# Patient Record
Sex: MALE | URBAN - METROPOLITAN AREA
[De-identification: names, ages, dates, MRNs, and addresses within clinical notes are randomized per-mention and may not be internally consistent; named-entity substitution may affect disease eponyms.]

---

## 2017-03-15 ENCOUNTER — DOCTOR'S OFFICE (OUTPATIENT)
Dept: URBAN - METROPOLITAN AREA CLINIC 2 | Facility: CLINIC | Age: 57
Setting detail: OPHTHALMOLOGY
End: 2017-03-15
Payer: COMMERCIAL

## 2017-03-15 DIAGNOSIS — H43.393: ICD-10-CM

## 2017-03-15 DIAGNOSIS — H40.003: ICD-10-CM

## 2017-03-15 DIAGNOSIS — E13.311: ICD-10-CM

## 2017-03-15 DIAGNOSIS — H04.553: ICD-10-CM

## 2017-03-15 DIAGNOSIS — E13.319: ICD-10-CM

## 2017-03-15 DIAGNOSIS — E11.319: ICD-10-CM

## 2017-03-15 DIAGNOSIS — H16.223: ICD-10-CM

## 2017-03-15 DIAGNOSIS — H35.373: ICD-10-CM

## 2017-03-15 PROCEDURE — 92083 EXTENDED VISUAL FIELD XM: CPT | Performed by: OPHTHALMOLOGY

## 2017-03-15 PROCEDURE — 68801 DILATE TEAR DUCT OPENING: CPT | Performed by: OPHTHALMOLOGY

## 2017-03-15 PROCEDURE — 92014 COMPRE OPH EXAM EST PT 1/>: CPT | Performed by: OPHTHALMOLOGY

## 2017-03-15 PROCEDURE — 92226 OPHTH EXT. SUB.: CPT | Performed by: OPHTHALMOLOGY

## 2017-03-15 PROCEDURE — 92134 CPTRZ OPH DX IMG PST SGM RTA: CPT | Performed by: OPHTHALMOLOGY

## 2017-03-15 ASSESSMENT — REFRACTION_CURRENTRX
OD_AXIS: 157
OD_CYLINDER: -0.25
OD_OVR_VA: 20/
OS_OVR_VA: 20/
OD_OVR_VA: 20/
OS_CYLINDER: -0.25
OS_AXIS: 107
OD_ADD: +1.25
OS_ADD: +1.25
OD_OVR_VA: 20/
OS_OVR_VA: 20/
OS_SPHERE: -0.75
OS_OVR_VA: 20/
OD_SPHERE: -0.50

## 2017-03-15 ASSESSMENT — REFRACTION_MANIFEST
OD_VA1: 20/
OS_VA2: 20/
OS_VA1: 20/
OS_VA1: 20/
OU_VA: 20/
OD_VA1: 20/
OS_VA2: 20/
OD_VA3: 20/
OD_VA2: 20/
OD_VA2: 20/
OS_VA3: 20/
OD_VA3: 20/
OU_VA: 20/
OS_VA3: 20/

## 2017-03-15 ASSESSMENT — CONFRONTATIONAL VISUAL FIELD TEST (CVF)
OS_FINDINGS: FULL
OD_FINDINGS: FULL

## 2017-03-15 ASSESSMENT — VISUAL ACUITY
OS_BCVA: 20/
OD_BCVA: 20/

## 2017-03-15 ASSESSMENT — REFRACTION_OUTSIDERX
OS_VA2: 20/
OD_VA3: 20/
OS_VA1: 20/
OS_SPHERE: -0.75
OS_ADD: +1.25
OS_AXIS: 107
OU_VA: 20/
OD_ADD: +1.25
OD_SPHERE: -0.50
OS_VA3: 20/
OD_VA1: 20/
OD_CYLINDER: -0.25
OD_VA2: 20/
OS_CYLINDER: -0.25
OD_AXIS: 157

## 2017-03-15 ASSESSMENT — REFRACTION_AUTOREFRACTION
OD_CYLINDER: -0.50
OD_AXIS: 112
OS_AXIS: 94
OD_SPHERE: +0.75
OS_CYLINDER: -1.00
OS_SPHERE: +0.50

## 2017-03-15 ASSESSMENT — SPHEQUIV_DERIVED
OS_SPHEQUIV: 0
OD_SPHEQUIV: 0.5

## 2017-03-15 ASSESSMENT — SUPERFICIAL PUNCTATE KERATITIS (SPK)
OD_SPK: 1+
OS_SPK: 1+

## 2018-01-23 NOTE — PATIENT DISCUSSION
The patient was told that their pupil does not dilate well and this will make for an operation that might involve extra steps and, perhaps, extra risks. This could also mean that their pupil might be larger after procedure.

## 2018-03-26 ENCOUNTER — DOCTOR'S OFFICE (OUTPATIENT)
Dept: URBAN - METROPOLITAN AREA CLINIC 2 | Facility: CLINIC | Age: 58
Setting detail: OPHTHALMOLOGY
End: 2018-03-26
Payer: COMMERCIAL

## 2018-03-26 DIAGNOSIS — H16.223: ICD-10-CM

## 2018-03-26 DIAGNOSIS — E13.311: ICD-10-CM

## 2018-03-26 DIAGNOSIS — H47.323: ICD-10-CM

## 2018-03-26 DIAGNOSIS — H43.393: ICD-10-CM

## 2018-03-26 DIAGNOSIS — H35.373: ICD-10-CM

## 2018-03-26 DIAGNOSIS — E11.319: ICD-10-CM

## 2018-03-26 DIAGNOSIS — H40.003: ICD-10-CM

## 2018-03-26 DIAGNOSIS — E13.319: ICD-10-CM

## 2018-03-26 PROCEDURE — 92250 FUNDUS PHOTOGRAPHY W/I&R: CPT | Performed by: OPHTHALMOLOGY

## 2018-03-26 PROCEDURE — 76512 OPH US DX B-SCAN: CPT | Performed by: OPHTHALMOLOGY

## 2018-03-26 PROCEDURE — 92014 COMPRE OPH EXAM EST PT 1/>: CPT | Performed by: OPHTHALMOLOGY

## 2018-03-26 PROCEDURE — 92083 EXTENDED VISUAL FIELD XM: CPT | Performed by: OPHTHALMOLOGY

## 2018-03-26 PROCEDURE — 99058 OFFICE EMERGENCY CARE: CPT | Performed by: OPHTHALMOLOGY

## 2018-03-26 PROCEDURE — 92134 CPTRZ OPH DX IMG PST SGM RTA: CPT | Performed by: OPHTHALMOLOGY

## 2018-03-26 ASSESSMENT — REFRACTION_MANIFEST
OD_VA1: 20/
OD_VA1: 20/
OD_VA2: 20/
OS_VA3: 20/
OD_VA3: 20/
OS_VA2: 20/
OD_VA3: 20/
OS_VA2: 20/
OU_VA: 20/
OS_VA1: 20/
OS_VA1: 20/
OU_VA: 20/
OD_VA2: 20/
OS_VA3: 20/

## 2018-03-26 ASSESSMENT — REFRACTION_OUTSIDERX
OS_VA1: 20/
OS_AXIS: 107
OD_VA3: 20/
OS_VA3: 20/
OS_SPHERE: -0.75
OD_VA2: 20/
OD_SPHERE: -0.50
OD_ADD: +1.25
OD_VA1: 20/
OU_VA: 20/
OD_CYLINDER: -0.25
OS_ADD: +1.25
OD_AXIS: 157
OS_VA2: 20/
OS_CYLINDER: -0.25

## 2018-03-26 ASSESSMENT — REFRACTION_CURRENTRX
OS_ADD: +1.25
OS_AXIS: 107
OS_CYLINDER: -0.25
OD_OVR_VA: 20/
OS_OVR_VA: 20/
OS_SPHERE: -0.75
OD_AXIS: 157
OD_OVR_VA: 20/
OD_CYLINDER: -0.25
OD_SPHERE: -0.50
OD_OVR_VA: 20/
OD_ADD: +1.25
OS_OVR_VA: 20/
OS_OVR_VA: 20/

## 2018-03-26 ASSESSMENT — REFRACTION_AUTOREFRACTION
OD_CYLINDER: -0.50
OS_CYLINDER: -1.00
OD_AXIS: 112
OS_AXIS: 94
OS_SPHERE: +0.50
OD_SPHERE: +0.75

## 2018-03-26 ASSESSMENT — VISUAL ACUITY
OS_BCVA: 20/
OD_BCVA: 20/

## 2018-03-26 ASSESSMENT — SPHEQUIV_DERIVED
OS_SPHEQUIV: 0
OD_SPHEQUIV: 0.5

## 2018-03-26 ASSESSMENT — SUPERFICIAL PUNCTATE KERATITIS (SPK)
OS_SPK: 1+
OD_SPK: 1+

## 2018-03-26 ASSESSMENT — CONFRONTATIONAL VISUAL FIELD TEST (CVF)
OD_FINDINGS: FULL
OS_FINDINGS: FULL

## 2018-10-24 NOTE — PROCEDURE NOTE: CLINICAL
PROCEDURE NOTE: Punctal Plugs, Silicone #2 Bilateral Lower Lids. Diagnosis: Dry Eye Syndrome. Anesthesia: Topical. Prior to treatment, the risks/benefits/alternatives were discussed. The patient wished to proceed with procedure. Permanent silicone plugs were inserted. Patient tolerated procedure well. There were no complications. Post procedure instructions given. Size . 6. Lukas Greening

## 2018-11-14 NOTE — PROCEDURE NOTE: CLINICAL
PROCEDURE NOTE: Punctal Plugs, Silicone #2 Bilateral Lower Lids. Diagnosis: Dry Eye Syndrome. Anesthesia: Topical. Prep: Antibiotic Drops q 5min x 3. Prior to treatment, the risks/benefits/alternatives were discussed. The patient wished to proceed with procedure. Permanent silicone plugs were inserted. Patient tolerated procedure well. There were no complications. Post procedure instructions given. Size . 7. Cheryl Rodriguez

## 2018-12-31 NOTE — PROCEDURE NOTE: CLINICAL
PROCEDURE NOTE: Punctal Plugs, Silicone #1 Anesthesia: Topical. Prep: Antibiotic Drops q 5min x 3. Prior to treatment, the risks/benefits/alternatives were discussed. The patient wished to proceed with procedure. Permanent silicone plugs were inserted. Patient tolerated procedure well. There were no complications. Post procedure instructions given. Size . 8. Cody Cool

## 2019-01-30 NOTE — PATIENT DISCUSSION
trial contact lens for +/- LASIK OS for dawood. Patient education on near vision worsening with the contact lens/ LASIK.

## 2019-04-27 PROBLEM — H43.393 VITREOUS FLOATERS: Status: ACTIVE | Noted: 2017-03-15

## 2019-04-27 PROBLEM — H35.373 MACULAR PUCKER: Status: ACTIVE | Noted: 2017-03-15

## 2019-04-27 PROBLEM — H40.003 PREGLAUCOMA; BOTH EYES: Status: ACTIVE | Noted: 2017-03-15

## 2019-04-27 PROBLEM — H47.323 DRUSEN OF OPTIC DISC; BOTH EYES: Status: ACTIVE | Noted: 2018-03-26

## 2019-04-27 PROBLEM — H16.223 KERATOCONJUNCTIVITIS SICCA: Status: ACTIVE | Noted: 2017-03-15

## 2019-04-27 PROBLEM — E13.311 BACKGROUND DIABETIC RETINOPATHY: Status: ACTIVE | Noted: 2018-03-26

## 2019-04-27 PROBLEM — E13.319 BACKGROUND DIABETIC RETINOPATHY: Status: ACTIVE | Noted: 2018-03-26

## 2019-04-27 PROBLEM — E11.319 BACKGROUND DIABETIC RETINOPATHY: Status: ACTIVE | Noted: 2018-03-26

## 2019-07-26 ENCOUNTER — OFFICE (OUTPATIENT)
Dept: URBAN - METROPOLITAN AREA CLINIC 109 | Facility: CLINIC | Age: 59
Setting detail: OPHTHALMOLOGY
End: 2019-07-26
Payer: COMMERCIAL

## 2019-07-26 DIAGNOSIS — H02.834: ICD-10-CM

## 2019-07-26 DIAGNOSIS — H16.223: ICD-10-CM

## 2019-07-26 DIAGNOSIS — H43.393: ICD-10-CM

## 2019-07-26 DIAGNOSIS — H10.45: ICD-10-CM

## 2019-07-26 DIAGNOSIS — E11.319: ICD-10-CM

## 2019-07-26 DIAGNOSIS — H02.832: ICD-10-CM

## 2019-07-26 DIAGNOSIS — H02.831: ICD-10-CM

## 2019-07-26 DIAGNOSIS — H35.373: ICD-10-CM

## 2019-07-26 DIAGNOSIS — H47.323: ICD-10-CM

## 2019-07-26 DIAGNOSIS — H02.835: ICD-10-CM

## 2019-07-26 DIAGNOSIS — H40.013: ICD-10-CM

## 2019-07-26 DIAGNOSIS — E13.319: ICD-10-CM

## 2019-07-26 PROCEDURE — 76514 ECHO EXAM OF EYE THICKNESS: CPT | Performed by: OPHTHALMOLOGY

## 2019-07-26 PROCEDURE — 92083 EXTENDED VISUAL FIELD XM: CPT | Performed by: OPHTHALMOLOGY

## 2019-07-26 PROCEDURE — 92133 CPTRZD OPH DX IMG PST SGM ON: CPT | Performed by: OPHTHALMOLOGY

## 2019-07-26 PROCEDURE — 92020 GONIOSCOPY: CPT | Performed by: OPHTHALMOLOGY

## 2019-07-26 PROCEDURE — 92226 OPHTHALMOSCOPY, SUBSEQUENT: CPT | Performed by: OPHTHALMOLOGY

## 2019-07-26 PROCEDURE — 92014 COMPRE OPH EXAM EST PT 1/>: CPT | Performed by: OPHTHALMOLOGY

## 2019-07-26 ASSESSMENT — REFRACTION_MANIFEST
OS_AXIS: 107
OD_VA1: 20/
OD_AXIS: 157
OU_VA: 20/
OS_VA2: 20/
OU_VA: 20/
OD_VA2: 20/
OS_ADD: +1.25
OD_SPHERE: -0.50
OS_VA2: 20/
OD_VA3: 20/
OD_VA2: 20/
OS_VA3: 20/
OD_VA3: 20/
OS_SPHERE: -0.75
OS_VA1: 20/
OD_CYLINDER: -0.25
OS_VA3: 20/
OD_VA1: 20/
OS_VA1: 20/
OD_ADD: +1.25
OS_CYLINDER: -0.25

## 2019-07-26 ASSESSMENT — REFRACTION_CURRENTRX
OD_SPHERE: -0.50
OD_OVR_VA: 20/
OD_OVR_VA: 20/
OD_AXIS: 157
OS_OVR_VA: 20/
OD_ADD: +1.25
OS_OVR_VA: 20/
OS_OVR_VA: 20/
OS_AXIS: 107
OD_CYLINDER: -0.25
OS_ADD: +1.25
OS_CYLINDER: -0.25
OD_OVR_VA: 20/
OS_SPHERE: -0.75

## 2019-07-26 ASSESSMENT — CONFRONTATIONAL VISUAL FIELD TEST (CVF)
OS_FINDINGS: FULL
OD_FINDINGS: FULL

## 2019-07-26 ASSESSMENT — LID POSITION - DERMATOCHALASIS
OD_DERMATOCHALASIS: 1+
OS_DERMATOCHALASIS: 1+

## 2019-07-26 ASSESSMENT — SPHEQUIV_DERIVED
OS_SPHEQUIV: -0.875
OS_SPHEQUIV: -0.125
OD_SPHEQUIV: -0.625
OD_SPHEQUIV: 0.75

## 2019-07-26 ASSESSMENT — SUPERFICIAL PUNCTATE KERATITIS (SPK)
OD_SPK: 1+
OS_SPK: 1+

## 2019-07-26 ASSESSMENT — VISUAL ACUITY
OS_BCVA: 20/20-2
OD_BCVA: 20/40

## 2019-07-26 ASSESSMENT — REFRACTION_AUTOREFRACTION
OS_AXIS: 93
OD_AXIS: 107
OD_SPHERE: +1.00
OS_CYLINDER: -0.75
OD_CYLINDER: -0.50
OS_SPHERE: +0.25

## 2019-07-26 ASSESSMENT — PACHYMETRY
OS_CT_CORRECTION: -1
OS_CT_UM: 555
OD_CT_UM: 553
OD_CT_CORRECTION: -1

## 2019-07-31 ENCOUNTER — IMPORTED ENCOUNTER (OUTPATIENT)
Age: 59
End: 2019-07-31

## 2019-10-28 ENCOUNTER — OFFICE (OUTPATIENT)
Dept: URBAN - METROPOLITAN AREA CLINIC 109 | Facility: CLINIC | Age: 59
Setting detail: OPHTHALMOLOGY
End: 2019-10-28
Payer: COMMERCIAL

## 2019-10-28 DIAGNOSIS — E11.319: ICD-10-CM

## 2019-10-28 DIAGNOSIS — E13.319: ICD-10-CM

## 2019-10-28 DIAGNOSIS — H35.373: ICD-10-CM

## 2019-10-28 PROCEDURE — 92014 COMPRE OPH EXAM EST PT 1/>: CPT | Performed by: OPHTHALMOLOGY

## 2019-10-28 PROCEDURE — 92250 FUNDUS PHOTOGRAPHY W/I&R: CPT | Performed by: OPHTHALMOLOGY

## 2019-10-28 ASSESSMENT — REFRACTION_AUTOREFRACTION
OS_AXIS: 95
OS_CYLINDER: -1.00
OD_AXIS: 104
OD_SPHERE: +0.50
OD_CYLINDER: -0.50
OS_SPHERE: +1.00

## 2019-10-28 ASSESSMENT — SUPERFICIAL PUNCTATE KERATITIS (SPK)
OS_SPK: 1+
OD_SPK: 1+

## 2019-10-28 ASSESSMENT — VISUAL ACUITY
OS_BCVA: 20/100
OD_BCVA: 20/50

## 2019-10-28 ASSESSMENT — REFRACTION_CURRENTRX
OD_CYLINDER: -0.25
OD_ADD: +1.25
OS_AXIS: 107
OD_AXIS: 157
OD_SPHERE: -0.50
OS_OVR_VA: 20/
OD_OVR_VA: 20/
OS_SPHERE: -0.75
OS_ADD: +1.25
OS_CYLINDER: -0.25

## 2019-10-28 ASSESSMENT — REFRACTION_MANIFEST
OS_ADD: +1.25
OS_SPHERE: -0.75
OS_AXIS: 107
OD_VA1: 20/
OD_SPHERE: -0.50
OD_VA2: 20/
OS_VA3: 20/
OD_CYLINDER: -0.25
OD_VA3: 20/
OD_AXIS: 157
OS_CYLINDER: -0.25
OU_VA: 20/
OS_VA2: 20/
OS_VA1: 20/
OD_ADD: +1.25

## 2019-10-28 ASSESSMENT — SPHEQUIV_DERIVED
OD_SPHEQUIV: -0.625
OS_SPHEQUIV: 0.5
OS_SPHEQUIV: -0.875
OD_SPHEQUIV: 0.25

## 2019-10-28 ASSESSMENT — LID POSITION - DERMATOCHALASIS
OD_DERMATOCHALASIS: 1+
OS_DERMATOCHALASIS: 1+

## 2019-10-28 ASSESSMENT — CONFRONTATIONAL VISUAL FIELD TEST (CVF)
OD_FINDINGS: FULL
OS_FINDINGS: FULL

## 2019-10-31 ENCOUNTER — OFFICE (OUTPATIENT)
Dept: URBAN - METROPOLITAN AREA CLINIC 32 | Facility: CLINIC | Age: 59
Setting detail: OPHTHALMOLOGY
End: 2019-10-31
Payer: COMMERCIAL

## 2019-10-31 DIAGNOSIS — H43.12: ICD-10-CM

## 2019-10-31 DIAGNOSIS — E11.3592: ICD-10-CM

## 2019-10-31 DIAGNOSIS — E11.3591: ICD-10-CM

## 2019-10-31 PROCEDURE — 92134 CPTRZ OPH DX IMG PST SGM RTA: CPT | Performed by: OPHTHALMOLOGY

## 2019-10-31 PROCEDURE — 92226 OPHTHALMOSCOPY, SUBSEQUENT: CPT | Performed by: OPHTHALMOLOGY

## 2019-10-31 PROCEDURE — 99215 OFFICE O/P EST HI 40 MIN: CPT | Performed by: OPHTHALMOLOGY

## 2019-10-31 PROCEDURE — 67028 INJECTION EYE DRUG: CPT | Performed by: OPHTHALMOLOGY

## 2019-10-31 PROCEDURE — 92226 OPHTHALMOSCOPY (DILATION), SUBSEQUENT: CPT | Performed by: OPHTHALMOLOGY

## 2019-10-31 PROCEDURE — 92235 FLUORESCEIN ANGRPH MLTIFRAME: CPT | Performed by: OPHTHALMOLOGY

## 2019-10-31 ASSESSMENT — REFRACTION_MANIFEST
OD_ADD: +1.25
OD_SPHERE: -0.50
OD_AXIS: 157
OS_VA3: 20/
OS_VA2: 20/
OD_VA1: 20/
OS_CYLINDER: -0.25
OS_ADD: +1.25
OS_AXIS: 107
OD_VA3: 20/
OS_SPHERE: -0.75
OD_VA2: 20/
OU_VA: 20/
OS_VA1: 20/
OD_CYLINDER: -0.25

## 2019-10-31 ASSESSMENT — CONFRONTATIONAL VISUAL FIELD TEST (CVF)
OS_FINDINGS: FULL
OD_FINDINGS: FULL

## 2019-10-31 ASSESSMENT — VISUAL ACUITY
OS_BCVA: 20/20
OD_BCVA: 20/20

## 2019-10-31 ASSESSMENT — REFRACTION_CURRENTRX
OD_ADD: +1.25
OS_SPHERE: -0.75
OD_OVR_VA: 20/
OD_AXIS: 157
OD_SPHERE: -0.50
OS_OVR_VA: 20/
OS_CYLINDER: -0.25
OS_AXIS: 107
OD_CYLINDER: -0.25
OS_ADD: +1.25

## 2019-10-31 ASSESSMENT — SUPERFICIAL PUNCTATE KERATITIS (SPK)
OS_SPK: 1+
OD_SPK: 1+

## 2019-10-31 ASSESSMENT — REFRACTION_AUTOREFRACTION
OD_SPHERE: +0.50
OS_CYLINDER: -1.00
OS_AXIS: 95
OD_AXIS: 104
OS_SPHERE: +1.00
OD_CYLINDER: -0.50

## 2019-10-31 ASSESSMENT — SPHEQUIV_DERIVED
OD_SPHEQUIV: 0.25
OS_SPHEQUIV: -0.875
OS_SPHEQUIV: 0.5
OD_SPHEQUIV: -0.625

## 2019-10-31 ASSESSMENT — LID POSITION - DERMATOCHALASIS
OD_DERMATOCHALASIS: 1+
OS_DERMATOCHALASIS: 1+

## 2019-11-07 ENCOUNTER — OFFICE (OUTPATIENT)
Dept: URBAN - METROPOLITAN AREA CLINIC 32 | Facility: CLINIC | Age: 59
Setting detail: OPHTHALMOLOGY
End: 2019-11-07
Payer: COMMERCIAL

## 2019-11-07 DIAGNOSIS — E11.3592: ICD-10-CM

## 2019-11-07 PROCEDURE — 67228 TREATMENT X10SV RETINOPATHY: CPT | Performed by: OPHTHALMOLOGY

## 2019-11-07 ASSESSMENT — REFRACTION_MANIFEST
OS_VA2: 20/
OS_VA3: 20/
OD_VA3: 20/
OS_AXIS: 107
OS_CYLINDER: -0.25
OD_VA1: 20/
OS_ADD: +1.25
OD_CYLINDER: -0.25
OD_ADD: +1.25
OD_SPHERE: -0.50
OU_VA: 20/
OS_VA1: 20/
OD_VA2: 20/
OS_SPHERE: -0.75
OD_AXIS: 157

## 2019-11-07 ASSESSMENT — CONFRONTATIONAL VISUAL FIELD TEST (CVF)
OS_FINDINGS: FULL
OD_FINDINGS: FULL

## 2019-11-07 ASSESSMENT — REFRACTION_AUTOREFRACTION
OS_AXIS: 95
OS_SPHERE: +1.00
OD_SPHERE: +0.50
OS_CYLINDER: -1.00
OD_AXIS: 104
OD_CYLINDER: -0.50

## 2019-11-07 ASSESSMENT — REFRACTION_CURRENTRX
OD_CYLINDER: -0.25
OS_ADD: +1.25
OS_CYLINDER: -0.25
OS_AXIS: 107
OD_SPHERE: -0.50
OD_ADD: +1.25
OS_SPHERE: -0.75
OD_AXIS: 157
OS_OVR_VA: 20/
OD_OVR_VA: 20/

## 2019-11-07 ASSESSMENT — VISUAL ACUITY
OD_BCVA: 20/40+
OS_BCVA: 20/20

## 2019-11-07 ASSESSMENT — SPHEQUIV_DERIVED
OD_SPHEQUIV: -0.625
OS_SPHEQUIV: -0.875
OS_SPHEQUIV: 0.5
OD_SPHEQUIV: 0.25

## 2019-11-21 ENCOUNTER — OFFICE (OUTPATIENT)
Dept: URBAN - METROPOLITAN AREA CLINIC 32 | Facility: CLINIC | Age: 59
Setting detail: OPHTHALMOLOGY
End: 2019-11-21
Payer: COMMERCIAL

## 2019-11-21 DIAGNOSIS — E11.3591: ICD-10-CM

## 2019-11-21 PROCEDURE — 67228 TREATMENT X10SV RETINOPATHY: CPT | Performed by: OPHTHALMOLOGY

## 2019-11-21 PROCEDURE — 92134 CPTRZ OPH DX IMG PST SGM RTA: CPT | Performed by: OPHTHALMOLOGY

## 2019-11-21 ASSESSMENT — REFRACTION_MANIFEST
OU_VA: 20/
OD_AXIS: 157
OS_VA2: 20/
OD_VA1: 20/
OD_ADD: +1.25
OS_VA1: 20/
OD_VA2: 20/
OS_ADD: +1.25
OD_SPHERE: -0.50
OS_SPHERE: -0.75
OS_AXIS: 107
OD_VA3: 20/
OD_CYLINDER: -0.25
OS_VA3: 20/
OS_CYLINDER: -0.25

## 2019-11-21 ASSESSMENT — SPHEQUIV_DERIVED
OD_SPHEQUIV: 0.25
OS_SPHEQUIV: -0.875
OD_SPHEQUIV: -0.625
OS_SPHEQUIV: 0.5

## 2019-11-21 ASSESSMENT — REFRACTION_AUTOREFRACTION
OD_CYLINDER: -0.50
OS_SPHERE: +1.00
OS_AXIS: 95
OD_AXIS: 104
OS_CYLINDER: -1.00
OD_SPHERE: +0.50

## 2019-11-21 ASSESSMENT — VISUAL ACUITY
OD_BCVA: 20/20-1
OS_BCVA: 20/20

## 2019-11-21 ASSESSMENT — REFRACTION_CURRENTRX
OD_SPHERE: -0.50
OD_OVR_VA: 20/
OS_OVR_VA: 20/
OD_ADD: +1.25
OD_CYLINDER: -0.25
OD_AXIS: 157
OS_ADD: +1.25
OS_SPHERE: -0.75
OS_CYLINDER: -0.25
OS_AXIS: 107

## 2019-11-21 ASSESSMENT — LID POSITION - DERMATOCHALASIS
OD_DERMATOCHALASIS: 1+
OS_DERMATOCHALASIS: 1+

## 2019-11-21 ASSESSMENT — SUPERFICIAL PUNCTATE KERATITIS (SPK)
OD_SPK: 1+
OS_SPK: 1+

## 2019-12-05 ENCOUNTER — OFFICE (OUTPATIENT)
Dept: URBAN - METROPOLITAN AREA CLINIC 32 | Facility: CLINIC | Age: 59
Setting detail: OPHTHALMOLOGY
End: 2019-12-05
Payer: COMMERCIAL

## 2019-12-05 DIAGNOSIS — E11.3592: ICD-10-CM

## 2019-12-05 PROCEDURE — 67228 TREATMENT X10SV RETINOPATHY: CPT | Performed by: OPHTHALMOLOGY

## 2019-12-05 ASSESSMENT — REFRACTION_MANIFEST
OD_VA2: 20/
OU_VA: 20/
OS_VA1: 20/
OD_ADD: +1.25
OS_VA2: 20/
OS_AXIS: 107
OD_VA1: 20/
OS_ADD: +1.25
OD_VA3: 20/
OD_AXIS: 157
OS_CYLINDER: -0.25
OS_VA3: 20/
OS_SPHERE: -0.75
OD_SPHERE: -0.50
OD_CYLINDER: -0.25

## 2019-12-05 ASSESSMENT — REFRACTION_AUTOREFRACTION
OD_AXIS: 104
OD_CYLINDER: -0.50
OS_CYLINDER: -1.00
OS_AXIS: 95
OS_SPHERE: +1.00
OD_SPHERE: +0.50

## 2019-12-05 ASSESSMENT — SPHEQUIV_DERIVED
OD_SPHEQUIV: -0.625
OS_SPHEQUIV: 0.5
OS_SPHEQUIV: -0.875
OD_SPHEQUIV: 0.25

## 2019-12-05 ASSESSMENT — REFRACTION_CURRENTRX
OS_CYLINDER: -0.25
OS_AXIS: 107
OD_OVR_VA: 20/
OS_OVR_VA: 20/
OS_SPHERE: -0.75
OD_AXIS: 157
OS_ADD: +1.25
OD_CYLINDER: -0.25
OD_SPHERE: -0.50
OD_ADD: +1.25

## 2019-12-05 ASSESSMENT — SUPERFICIAL PUNCTATE KERATITIS (SPK)
OD_SPK: 1+
OS_SPK: 1+

## 2019-12-05 ASSESSMENT — CONFRONTATIONAL VISUAL FIELD TEST (CVF)
OD_FINDINGS: FULL
OS_FINDINGS: FULL

## 2019-12-05 ASSESSMENT — LID POSITION - DERMATOCHALASIS
OS_DERMATOCHALASIS: 1+
OD_DERMATOCHALASIS: 1+

## 2019-12-05 ASSESSMENT — VISUAL ACUITY
OD_BCVA: 20/20
OS_BCVA: 20/20

## 2019-12-12 ENCOUNTER — OFFICE (OUTPATIENT)
Dept: URBAN - METROPOLITAN AREA CLINIC 32 | Facility: CLINIC | Age: 59
Setting detail: OPHTHALMOLOGY
End: 2019-12-12
Payer: COMMERCIAL

## 2019-12-12 DIAGNOSIS — E11.3592: ICD-10-CM

## 2019-12-12 DIAGNOSIS — E11.3591: ICD-10-CM

## 2019-12-12 PROCEDURE — 67028 INJECTION EYE DRUG: CPT | Performed by: OPHTHALMOLOGY

## 2019-12-12 PROCEDURE — 92134 CPTRZ OPH DX IMG PST SGM RTA: CPT | Performed by: OPHTHALMOLOGY

## 2019-12-12 ASSESSMENT — REFRACTION_CURRENTRX
OD_CYLINDER: -0.25
OS_ADD: +1.25
OD_OVR_VA: 20/
OS_AXIS: 107
OD_AXIS: 157
OD_SPHERE: -0.50
OS_SPHERE: -0.75
OD_ADD: +1.25
OS_OVR_VA: 20/
OS_CYLINDER: -0.25

## 2019-12-12 ASSESSMENT — REFRACTION_MANIFEST
OD_AXIS: 157
OS_VA2: 20/
OS_VA1: 20/
OD_VA2: 20/
OD_VA3: 20/
OU_VA: 20/
OS_ADD: +1.25
OS_AXIS: 107
OS_SPHERE: -0.75
OD_VA1: 20/
OS_CYLINDER: -0.25
OD_SPHERE: -0.50
OD_ADD: +1.25
OS_VA3: 20/
OD_CYLINDER: -0.25

## 2019-12-12 ASSESSMENT — SPHEQUIV_DERIVED
OD_SPHEQUIV: -0.625
OS_SPHEQUIV: -0.875
OS_SPHEQUIV: 0.5
OD_SPHEQUIV: 0.25

## 2019-12-12 ASSESSMENT — REFRACTION_AUTOREFRACTION
OS_AXIS: 95
OS_SPHERE: +1.00
OS_CYLINDER: -1.00
OD_AXIS: 104
OD_CYLINDER: -0.50
OD_SPHERE: +0.50

## 2019-12-12 ASSESSMENT — SUPERFICIAL PUNCTATE KERATITIS (SPK)
OS_SPK: 1+
OD_SPK: 1+

## 2019-12-12 ASSESSMENT — CONFRONTATIONAL VISUAL FIELD TEST (CVF)
OD_FINDINGS: FULL
OS_FINDINGS: FULL

## 2019-12-12 ASSESSMENT — LID POSITION - DERMATOCHALASIS
OS_DERMATOCHALASIS: 1+
OD_DERMATOCHALASIS: 1+

## 2019-12-12 ASSESSMENT — VISUAL ACUITY
OD_BCVA: 20/20
OS_BCVA: 20/20

## 2019-12-19 ENCOUNTER — OFFICE (OUTPATIENT)
Dept: URBAN - METROPOLITAN AREA CLINIC 32 | Facility: CLINIC | Age: 59
Setting detail: OPHTHALMOLOGY
End: 2019-12-19
Payer: COMMERCIAL

## 2019-12-19 DIAGNOSIS — E11.3591: ICD-10-CM

## 2019-12-19 PROCEDURE — 67228 TREATMENT X10SV RETINOPATHY: CPT | Performed by: OPHTHALMOLOGY

## 2019-12-19 ASSESSMENT — SUPERFICIAL PUNCTATE KERATITIS (SPK)
OS_SPK: 1+
OD_SPK: 1+

## 2019-12-19 ASSESSMENT — REFRACTION_CURRENTRX
OD_CYLINDER: -0.25
OS_CYLINDER: -0.25
OD_SPHERE: -0.50
OD_ADD: +1.25
OD_OVR_VA: 20/
OS_ADD: +1.25
OS_OVR_VA: 20/
OD_AXIS: 157
OS_SPHERE: -0.75
OS_AXIS: 107

## 2019-12-19 ASSESSMENT — REFRACTION_MANIFEST
OD_ADD: +1.25
OS_VA3: 20/
OS_SPHERE: -0.75
OD_AXIS: 157
OD_VA3: 20/
OD_VA1: 20/
OS_AXIS: 107
OD_SPHERE: -0.50
OD_VA2: 20/
OS_CYLINDER: -0.25
OD_CYLINDER: -0.25
OS_VA1: 20/
OS_ADD: +1.25
OU_VA: 20/
OS_VA2: 20/

## 2019-12-19 ASSESSMENT — SPHEQUIV_DERIVED
OS_SPHEQUIV: 0.5
OS_SPHEQUIV: -0.875
OD_SPHEQUIV: -0.625
OD_SPHEQUIV: 0.25

## 2019-12-19 ASSESSMENT — LID POSITION - DERMATOCHALASIS
OD_DERMATOCHALASIS: 1+
OS_DERMATOCHALASIS: 1+

## 2019-12-19 ASSESSMENT — REFRACTION_AUTOREFRACTION
OD_AXIS: 104
OD_CYLINDER: -0.50
OD_SPHERE: +0.50
OS_CYLINDER: -1.00
OS_AXIS: 95
OS_SPHERE: +1.00

## 2019-12-19 ASSESSMENT — VISUAL ACUITY
OS_BCVA: 20/20
OD_BCVA: 20/20

## 2019-12-19 ASSESSMENT — CONFRONTATIONAL VISUAL FIELD TEST (CVF)
OD_FINDINGS: FULL
OS_FINDINGS: FULL

## 2020-01-23 ENCOUNTER — OFFICE (OUTPATIENT)
Dept: URBAN - METROPOLITAN AREA CLINIC 32 | Facility: CLINIC | Age: 60
Setting detail: OPHTHALMOLOGY
End: 2020-01-23
Payer: COMMERCIAL

## 2020-01-23 DIAGNOSIS — H43.12: ICD-10-CM

## 2020-01-23 DIAGNOSIS — E11.3591: ICD-10-CM

## 2020-01-23 DIAGNOSIS — E11.3592: ICD-10-CM

## 2020-01-23 PROCEDURE — 92235 FLUORESCEIN ANGRPH MLTIFRAME: CPT | Performed by: OPHTHALMOLOGY

## 2020-01-23 PROCEDURE — 92014 COMPRE OPH EXAM EST PT 1/>: CPT | Performed by: OPHTHALMOLOGY

## 2020-01-23 PROCEDURE — 92202 OPSCPY EXTND ON/MAC DRAW: CPT | Performed by: OPHTHALMOLOGY

## 2020-01-23 PROCEDURE — 92134 CPTRZ OPH DX IMG PST SGM RTA: CPT | Performed by: OPHTHALMOLOGY

## 2020-01-23 ASSESSMENT — SUPERFICIAL PUNCTATE KERATITIS (SPK)
OS_SPK: 1+
OD_SPK: 1+

## 2020-01-23 ASSESSMENT — REFRACTION_CURRENTRX
OD_CYLINDER: -0.25
OS_ADD: +1.25
OS_AXIS: 107
OS_CYLINDER: -0.25
OS_SPHERE: -0.75
OD_AXIS: 157
OD_ADD: +1.25
OD_SPHERE: -0.50
OD_OVR_VA: 20/
OS_OVR_VA: 20/

## 2020-01-23 ASSESSMENT — REFRACTION_MANIFEST
OS_VA2: 20/
OS_VA1: 20/
OD_SPHERE: -0.50
OS_SPHERE: -0.75
OD_VA3: 20/
OU_VA: 20/
OS_VA3: 20/
OS_AXIS: 107
OD_CYLINDER: -0.25
OS_CYLINDER: -0.25
OS_ADD: +1.25
OD_AXIS: 157
OD_ADD: +1.25
OD_VA2: 20/
OD_VA1: 20/

## 2020-01-23 ASSESSMENT — CONFRONTATIONAL VISUAL FIELD TEST (CVF)
OD_FINDINGS: FULL
OS_FINDINGS: FULL

## 2020-01-23 ASSESSMENT — SPHEQUIV_DERIVED
OD_SPHEQUIV: -0.625
OD_SPHEQUIV: 0.25
OS_SPHEQUIV: -0.875
OS_SPHEQUIV: 0.5

## 2020-01-23 ASSESSMENT — REFRACTION_AUTOREFRACTION
OS_SPHERE: +1.00
OD_CYLINDER: -0.50
OD_AXIS: 104
OD_SPHERE: +0.50
OS_CYLINDER: -1.00
OS_AXIS: 95

## 2020-01-23 ASSESSMENT — LID POSITION - DERMATOCHALASIS
OS_DERMATOCHALASIS: 1+
OD_DERMATOCHALASIS: 1+

## 2020-01-23 ASSESSMENT — VISUAL ACUITY
OD_BCVA: 20/20-1
OS_BCVA: 20/20

## 2020-01-27 ENCOUNTER — OFFICE (OUTPATIENT)
Dept: URBAN - METROPOLITAN AREA CLINIC 109 | Facility: CLINIC | Age: 60
Setting detail: OPHTHALMOLOGY
End: 2020-01-27
Payer: COMMERCIAL

## 2020-01-27 DIAGNOSIS — H40.013: ICD-10-CM

## 2020-01-27 DIAGNOSIS — E11.3591: ICD-10-CM

## 2020-01-27 DIAGNOSIS — E11.3592: ICD-10-CM

## 2020-01-27 DIAGNOSIS — H43.12: ICD-10-CM

## 2020-01-27 PROCEDURE — 92133 CPTRZD OPH DX IMG PST SGM ON: CPT | Performed by: OPHTHALMOLOGY

## 2020-01-27 PROCEDURE — 92014 COMPRE OPH EXAM EST PT 1/>: CPT | Performed by: OPHTHALMOLOGY

## 2020-01-27 PROCEDURE — 92083 EXTENDED VISUAL FIELD XM: CPT | Performed by: OPHTHALMOLOGY

## 2020-01-27 ASSESSMENT — SPHEQUIV_DERIVED
OD_SPHEQUIV: 0.25
OS_SPHEQUIV: 0.5
OD_SPHEQUIV: -0.625
OS_SPHEQUIV: -0.875

## 2020-01-27 ASSESSMENT — REFRACTION_MANIFEST
OD_AXIS: 157
OU_VA: 20/
OD_VA2: 20/
OD_SPHERE: -0.50
OD_VA3: 20/
OS_SPHERE: -0.75
OD_CYLINDER: -0.25
OS_VA3: 20/
OS_VA2: 20/
OD_VA1: 20/
OD_ADD: +1.25
OS_ADD: +1.25
OS_AXIS: 107
OS_VA1: 20/
OS_CYLINDER: -0.25

## 2020-01-27 ASSESSMENT — SUPERFICIAL PUNCTATE KERATITIS (SPK)
OD_SPK: 1+
OS_SPK: 1+

## 2020-01-27 ASSESSMENT — REFRACTION_AUTOREFRACTION
OD_SPHERE: +0.50
OD_AXIS: 104
OS_CYLINDER: -1.00
OD_CYLINDER: -0.50
OS_SPHERE: +1.00
OS_AXIS: 95

## 2020-01-27 ASSESSMENT — REFRACTION_CURRENTRX
OD_CYLINDER: -0.25
OS_CYLINDER: -0.25
OS_OVR_VA: 20/
OS_AXIS: 107
OD_OVR_VA: 20/
OD_AXIS: 157
OD_ADD: +1.25
OS_ADD: +1.25
OD_SPHERE: -0.50
OS_SPHERE: -0.75

## 2020-01-27 ASSESSMENT — VISUAL ACUITY
OD_BCVA: 20/30-2
OS_BCVA: 20/25-2

## 2020-01-27 ASSESSMENT — LID POSITION - DERMATOCHALASIS
OS_DERMATOCHALASIS: 1+
OD_DERMATOCHALASIS: 1+

## 2020-01-27 ASSESSMENT — CONFRONTATIONAL VISUAL FIELD TEST (CVF)
OD_FINDINGS: FULL
OS_FINDINGS: FULL

## 2021-04-03 ENCOUNTER — OFFICE (OUTPATIENT)
Dept: URBAN - METROPOLITAN AREA CLINIC 109 | Facility: CLINIC | Age: 61
Setting detail: OPHTHALMOLOGY
End: 2021-04-03
Payer: COMMERCIAL

## 2021-04-03 DIAGNOSIS — H40.013: ICD-10-CM

## 2021-04-03 DIAGNOSIS — H16.223: ICD-10-CM

## 2021-04-03 DIAGNOSIS — H25.13: ICD-10-CM

## 2021-04-03 DIAGNOSIS — E11.3511: ICD-10-CM

## 2021-04-03 DIAGNOSIS — H47.323: ICD-10-CM

## 2021-04-03 PROCEDURE — 99214 OFFICE O/P EST MOD 30 MIN: CPT | Performed by: OPHTHALMOLOGY

## 2021-04-03 PROCEDURE — 92250 FUNDUS PHOTOGRAPHY W/I&R: CPT | Performed by: OPHTHALMOLOGY

## 2021-04-03 PROCEDURE — 92020 GONIOSCOPY: CPT | Performed by: OPHTHALMOLOGY

## 2021-04-03 ASSESSMENT — REFRACTION_MANIFEST
OD_AXIS: 157
OD_SPHERE: -0.50
OS_SPHERE: -0.75
OD_CYLINDER: -0.25
OS_AXIS: 107
OD_ADD: +1.25
OS_ADD: +1.25
OS_CYLINDER: -0.25

## 2021-04-03 ASSESSMENT — PACHYMETRY
OS_CT_UM: 555
OD_CT_CORRECTION: -1
OD_CT_UM: 553
OS_CT_CORRECTION: -1

## 2021-04-03 ASSESSMENT — SUPERFICIAL PUNCTATE KERATITIS (SPK)
OS_SPK: 1+
OD_SPK: 1+

## 2021-04-03 ASSESSMENT — REFRACTION_AUTOREFRACTION
OS_SPHERE: +1.00
OS_AXIS: 95
OD_CYLINDER: -0.50
OD_SPHERE: +0.50
OS_CYLINDER: -1.00
OD_AXIS: 104

## 2021-04-03 ASSESSMENT — CONFRONTATIONAL VISUAL FIELD TEST (CVF)
OS_FINDINGS: FULL
OD_FINDINGS: FULL

## 2021-04-03 ASSESSMENT — VISUAL ACUITY
OD_BCVA: 20/25
OS_BCVA: 20/400

## 2021-04-03 ASSESSMENT — SPHEQUIV_DERIVED
OD_SPHEQUIV: -0.625
OS_SPHEQUIV: 0.5
OD_SPHEQUIV: 0.25
OS_SPHEQUIV: -0.875

## 2021-04-03 ASSESSMENT — REFRACTION_CURRENTRX
OD_CYLINDER: -0.25
OD_SPHERE: -0.50
OD_ADD: +1.25
OS_OVR_VA: 20/
OS_AXIS: 107
OS_ADD: +1.25
OS_CYLINDER: -0.25
OS_SPHERE: -0.75
OD_AXIS: 157
OD_OVR_VA: 20/

## 2021-04-03 ASSESSMENT — LID POSITION - DERMATOCHALASIS
OS_DERMATOCHALASIS: 1+
OD_DERMATOCHALASIS: 1+

## 2021-04-03 ASSESSMENT — TONOMETRY
OS_IOP_MMHG: 15
OD_IOP_MMHG: 15

## 2021-04-05 ENCOUNTER — OFFICE (OUTPATIENT)
Dept: URBAN - METROPOLITAN AREA CLINIC 32 | Facility: CLINIC | Age: 61
Setting detail: OPHTHALMOLOGY
End: 2021-04-05
Payer: COMMERCIAL

## 2021-04-05 DIAGNOSIS — H43.11: ICD-10-CM

## 2021-04-05 DIAGNOSIS — E11.3552: ICD-10-CM

## 2021-04-05 DIAGNOSIS — E11.3591: ICD-10-CM

## 2021-04-05 DIAGNOSIS — H35.373: ICD-10-CM

## 2021-04-05 PROCEDURE — 92201 OPSCPY EXTND RTA DRAW UNI/BI: CPT | Performed by: OPHTHALMOLOGY

## 2021-04-05 PROCEDURE — 92134 CPTRZ OPH DX IMG PST SGM RTA: CPT | Performed by: OPHTHALMOLOGY

## 2021-04-05 PROCEDURE — 67028 INJECTION EYE DRUG: CPT | Performed by: OPHTHALMOLOGY

## 2021-04-05 PROCEDURE — 99214 OFFICE O/P EST MOD 30 MIN: CPT | Performed by: OPHTHALMOLOGY

## 2021-04-05 ASSESSMENT — REFRACTION_CURRENTRX
OD_SPHERE: -0.50
OD_OVR_VA: 20/
OS_AXIS: 107
OS_ADD: +1.25
OS_SPHERE: -0.75
OS_OVR_VA: 20/
OD_AXIS: 157
OD_ADD: +1.25
OD_CYLINDER: -0.25
OS_CYLINDER: -0.25

## 2021-04-05 ASSESSMENT — REFRACTION_MANIFEST
OD_SPHERE: -0.50
OD_ADD: +1.25
OS_SPHERE: -0.75
OS_ADD: +1.25
OD_CYLINDER: -0.25
OS_AXIS: 107
OS_CYLINDER: -0.25
OD_AXIS: 157

## 2021-04-05 ASSESSMENT — SPHEQUIV_DERIVED
OD_SPHEQUIV: 0.25
OS_SPHEQUIV: -0.875
OS_SPHEQUIV: 0.5
OD_SPHEQUIV: -0.625

## 2021-04-05 ASSESSMENT — LID POSITION - DERMATOCHALASIS
OD_DERMATOCHALASIS: 1+
OS_DERMATOCHALASIS: 1+

## 2021-04-05 ASSESSMENT — REFRACTION_AUTOREFRACTION
OS_SPHERE: +1.00
OS_AXIS: 95
OD_SPHERE: +0.50
OD_CYLINDER: -0.50
OD_AXIS: 104
OS_CYLINDER: -1.00

## 2021-04-05 ASSESSMENT — VISUAL ACUITY
OS_BCVA: 20/HM
OD_BCVA: 20/40

## 2021-04-05 ASSESSMENT — SUPERFICIAL PUNCTATE KERATITIS (SPK)
OS_SPK: 1+
OD_SPK: 1+

## 2021-04-29 ENCOUNTER — OFFICE (OUTPATIENT)
Dept: URBAN - METROPOLITAN AREA CLINIC 32 | Facility: CLINIC | Age: 61
Setting detail: OPHTHALMOLOGY
End: 2021-04-29
Payer: COMMERCIAL

## 2021-04-29 DIAGNOSIS — H35.373: ICD-10-CM

## 2021-04-29 DIAGNOSIS — E11.3591: ICD-10-CM

## 2021-04-29 DIAGNOSIS — H43.11: ICD-10-CM

## 2021-04-29 DIAGNOSIS — E11.3552: ICD-10-CM

## 2021-04-29 PROCEDURE — 92134 CPTRZ OPH DX IMG PST SGM RTA: CPT | Performed by: OPHTHALMOLOGY

## 2021-04-29 PROCEDURE — 92201 OPSCPY EXTND RTA DRAW UNI/BI: CPT | Performed by: OPHTHALMOLOGY

## 2021-04-29 PROCEDURE — 99215 OFFICE O/P EST HI 40 MIN: CPT | Performed by: OPHTHALMOLOGY

## 2021-04-29 ASSESSMENT — REFRACTION_MANIFEST
OS_SPHERE: -0.75
OD_CYLINDER: -0.25
OD_ADD: +1.25
OS_CYLINDER: -0.25
OD_SPHERE: -0.50
OS_ADD: +1.25
OD_AXIS: 157
OS_AXIS: 107

## 2021-04-29 ASSESSMENT — SPHEQUIV_DERIVED
OS_SPHEQUIV: -0.875
OS_SPHEQUIV: 0.5
OD_SPHEQUIV: 0.25
OD_SPHEQUIV: -0.625

## 2021-04-29 ASSESSMENT — REFRACTION_AUTOREFRACTION
OD_SPHERE: +0.50
OS_SPHERE: +1.00
OD_CYLINDER: -0.50
OS_AXIS: 95
OD_AXIS: 104
OS_CYLINDER: -1.00

## 2021-04-29 ASSESSMENT — REFRACTION_CURRENTRX
OS_SPHERE: -0.75
OD_CYLINDER: -0.25
OS_OVR_VA: 20/
OD_AXIS: 157
OS_CYLINDER: -0.25
OS_ADD: +1.25
OD_OVR_VA: 20/
OD_ADD: +1.25
OS_AXIS: 107
OD_SPHERE: -0.50

## 2021-04-29 ASSESSMENT — VISUAL ACUITY
OS_BCVA: CF@1 FT
OD_BCVA: 20/40+2

## 2021-04-29 ASSESSMENT — LID POSITION - DERMATOCHALASIS
OD_DERMATOCHALASIS: 1+
OS_DERMATOCHALASIS: 1+

## 2021-04-29 ASSESSMENT — SUPERFICIAL PUNCTATE KERATITIS (SPK)
OD_SPK: 1+
OS_SPK: 1+

## 2021-05-10 ENCOUNTER — OFFICE (OUTPATIENT)
Dept: URBAN - METROPOLITAN AREA CLINIC 32 | Facility: CLINIC | Age: 61
Setting detail: OPHTHALMOLOGY
End: 2021-05-10
Payer: COMMERCIAL

## 2021-05-10 DIAGNOSIS — H35.373: ICD-10-CM

## 2021-05-10 DIAGNOSIS — H43.11: ICD-10-CM

## 2021-05-10 DIAGNOSIS — E11.3591: ICD-10-CM

## 2021-05-10 DIAGNOSIS — E11.3552: ICD-10-CM

## 2021-05-10 PROCEDURE — 99214 OFFICE O/P EST MOD 30 MIN: CPT | Performed by: OPHTHALMOLOGY

## 2021-05-10 PROCEDURE — 67028 INJECTION EYE DRUG: CPT | Performed by: OPHTHALMOLOGY

## 2021-05-10 PROCEDURE — 92134 CPTRZ OPH DX IMG PST SGM RTA: CPT | Performed by: OPHTHALMOLOGY

## 2021-05-10 PROCEDURE — 76512 OPH US DX B-SCAN: CPT | Performed by: OPHTHALMOLOGY

## 2021-05-10 ASSESSMENT — SPHEQUIV_DERIVED
OS_SPHEQUIV: 0.5
OS_SPHEQUIV: -0.875
OD_SPHEQUIV: 0.25
OD_SPHEQUIV: -0.625

## 2021-05-10 ASSESSMENT — REFRACTION_MANIFEST
OD_SPHERE: -0.50
OS_AXIS: 107
OD_CYLINDER: -0.25
OS_CYLINDER: -0.25
OS_ADD: +1.25
OD_ADD: +1.25
OD_AXIS: 157
OS_SPHERE: -0.75

## 2021-05-10 ASSESSMENT — LID POSITION - DERMATOCHALASIS
OS_DERMATOCHALASIS: 1+
OD_DERMATOCHALASIS: 1+

## 2021-05-10 ASSESSMENT — REFRACTION_AUTOREFRACTION
OD_AXIS: 104
OD_SPHERE: +0.50
OS_AXIS: 95
OS_SPHERE: +1.00
OS_CYLINDER: -1.00
OD_CYLINDER: -0.50

## 2021-05-10 ASSESSMENT — VISUAL ACUITY
OS_BCVA: CF@4FT
OD_BCVA: 20/50-2

## 2021-05-10 ASSESSMENT — SUPERFICIAL PUNCTATE KERATITIS (SPK)
OD_SPK: 1+
OS_SPK: 1+

## 2021-05-10 ASSESSMENT — REFRACTION_CURRENTRX
OD_OVR_VA: 20/
OD_CYLINDER: -0.25
OD_SPHERE: -0.50
OD_AXIS: 157
OS_CYLINDER: -0.25
OD_ADD: +1.25
OS_OVR_VA: 20/
OS_ADD: +1.25
OS_SPHERE: -0.75
OS_AXIS: 107

## 2021-06-22 ENCOUNTER — OFFICE (OUTPATIENT)
Dept: URBAN - METROPOLITAN AREA CLINIC 32 | Facility: CLINIC | Age: 61
Setting detail: OPHTHALMOLOGY
End: 2021-06-22
Payer: COMMERCIAL

## 2021-06-22 DIAGNOSIS — H43.11: ICD-10-CM

## 2021-06-22 DIAGNOSIS — E11.3591: ICD-10-CM

## 2021-06-22 DIAGNOSIS — E11.3552: ICD-10-CM

## 2021-06-22 DIAGNOSIS — H35.373: ICD-10-CM

## 2021-06-22 PROCEDURE — 92201 OPSCPY EXTND RTA DRAW UNI/BI: CPT | Performed by: OPHTHALMOLOGY

## 2021-06-22 PROCEDURE — 99213 OFFICE O/P EST LOW 20 MIN: CPT | Performed by: OPHTHALMOLOGY

## 2021-06-22 PROCEDURE — 67028 INJECTION EYE DRUG: CPT | Performed by: OPHTHALMOLOGY

## 2021-06-22 PROCEDURE — 92134 CPTRZ OPH DX IMG PST SGM RTA: CPT | Performed by: OPHTHALMOLOGY

## 2021-06-22 ASSESSMENT — SUPERFICIAL PUNCTATE KERATITIS (SPK)
OD_SPK: 1+
OS_SPK: 1+

## 2021-06-22 ASSESSMENT — REFRACTION_MANIFEST
OS_AXIS: 107
OD_ADD: +1.25
OD_AXIS: 157
OS_CYLINDER: -0.25
OD_CYLINDER: -0.25
OS_SPHERE: -0.75
OD_SPHERE: -0.50
OS_ADD: +1.25

## 2021-06-22 ASSESSMENT — REFRACTION_AUTOREFRACTION
OS_CYLINDER: -1.00
OD_SPHERE: +0.50
OD_CYLINDER: -0.50
OD_AXIS: 104
OS_SPHERE: +1.00
OS_AXIS: 95

## 2021-06-22 ASSESSMENT — CONFRONTATIONAL VISUAL FIELD TEST (CVF)
OS_FINDINGS: FULL
OD_FINDINGS: FULL

## 2021-06-22 ASSESSMENT — REFRACTION_CURRENTRX
OS_OVR_VA: 20/
OD_SPHERE: -0.50
OD_OVR_VA: 20/
OD_AXIS: 157
OS_CYLINDER: -0.25
OS_ADD: +1.25
OD_CYLINDER: -0.25
OS_SPHERE: -0.75
OS_AXIS: 107
OD_ADD: +1.25

## 2021-06-22 ASSESSMENT — LID POSITION - DERMATOCHALASIS
OS_DERMATOCHALASIS: 1+
OD_DERMATOCHALASIS: 1+

## 2021-06-22 ASSESSMENT — VISUAL ACUITY
OD_BCVA: 20/60-2
OS_BCVA: CF@3FT

## 2021-06-22 ASSESSMENT — SPHEQUIV_DERIVED
OS_SPHEQUIV: -0.875
OD_SPHEQUIV: -0.625
OS_SPHEQUIV: 0.5
OD_SPHEQUIV: 0.25

## 2021-07-27 ENCOUNTER — OFFICE (OUTPATIENT)
Dept: URBAN - METROPOLITAN AREA CLINIC 32 | Facility: CLINIC | Age: 61
Setting detail: OPHTHALMOLOGY
End: 2021-07-27
Payer: COMMERCIAL

## 2021-07-27 DIAGNOSIS — E11.3591: ICD-10-CM

## 2021-07-27 PROCEDURE — 92134 CPTRZ OPH DX IMG PST SGM RTA: CPT | Performed by: OPHTHALMOLOGY

## 2021-07-27 PROCEDURE — 67028 INJECTION EYE DRUG: CPT | Performed by: OPHTHALMOLOGY

## 2021-07-27 ASSESSMENT — REFRACTION_MANIFEST
OD_SPHERE: -0.50
OD_ADD: +1.25
OD_CYLINDER: -0.25
OS_CYLINDER: -0.25
OS_SPHERE: -0.75
OS_AXIS: 107
OD_AXIS: 157
OS_ADD: +1.25

## 2021-07-27 ASSESSMENT — REFRACTION_CURRENTRX
OS_AXIS: 107
OD_OVR_VA: 20/
OS_OVR_VA: 20/
OD_ADD: +1.25
OD_CYLINDER: -0.25
OS_ADD: +1.25
OS_SPHERE: -0.75
OD_AXIS: 157
OD_SPHERE: -0.50
OS_CYLINDER: -0.25

## 2021-07-27 ASSESSMENT — REFRACTION_AUTOREFRACTION
OS_CYLINDER: -1.00
OS_AXIS: 95
OD_SPHERE: +0.50
OS_SPHERE: +1.00
OD_CYLINDER: -0.50
OD_AXIS: 104

## 2021-07-27 ASSESSMENT — SPHEQUIV_DERIVED
OD_SPHEQUIV: -0.625
OD_SPHEQUIV: 0.25
OS_SPHEQUIV: -0.875
OS_SPHEQUIV: 0.5

## 2021-07-27 ASSESSMENT — VISUAL ACUITY
OD_BCVA: 20/60
OS_BCVA: CF@3FT

## 2021-08-13 ENCOUNTER — OFFICE (OUTPATIENT)
Dept: URBAN - METROPOLITAN AREA CLINIC 32 | Facility: CLINIC | Age: 61
Setting detail: OPHTHALMOLOGY
End: 2021-08-13
Payer: COMMERCIAL

## 2021-08-13 DIAGNOSIS — E11.3552: ICD-10-CM

## 2021-08-13 DIAGNOSIS — E11.3591: ICD-10-CM

## 2021-08-13 PROCEDURE — 92134 CPTRZ OPH DX IMG PST SGM RTA: CPT | Performed by: OPHTHALMOLOGY

## 2021-08-13 PROCEDURE — 67228 TREATMENT X10SV RETINOPATHY: CPT | Performed by: OPHTHALMOLOGY

## 2021-08-13 ASSESSMENT — SPHEQUIV_DERIVED
OS_SPHEQUIV: -0.875
OD_SPHEQUIV: 0.25
OS_SPHEQUIV: 0.5
OD_SPHEQUIV: -0.625

## 2021-08-13 ASSESSMENT — REFRACTION_CURRENTRX
OS_ADD: +1.25
OS_CYLINDER: -0.25
OS_SPHERE: -0.75
OD_ADD: +1.25
OD_CYLINDER: -0.25
OD_SPHERE: -0.50
OD_AXIS: 157
OS_OVR_VA: 20/
OD_OVR_VA: 20/
OS_AXIS: 107

## 2021-08-13 ASSESSMENT — REFRACTION_AUTOREFRACTION
OD_SPHERE: +0.50
OD_CYLINDER: -0.50
OS_AXIS: 95
OS_SPHERE: +1.00
OS_CYLINDER: -1.00
OD_AXIS: 104

## 2021-08-13 ASSESSMENT — REFRACTION_MANIFEST
OS_SPHERE: -0.75
OD_SPHERE: -0.50
OS_CYLINDER: -0.25
OD_CYLINDER: -0.25
OD_AXIS: 157
OS_ADD: +1.25
OS_AXIS: 107
OD_ADD: +1.25

## 2021-08-13 ASSESSMENT — LID POSITION - DERMATOCHALASIS
OS_DERMATOCHALASIS: 1+
OD_DERMATOCHALASIS: 1+

## 2021-08-13 ASSESSMENT — SUPERFICIAL PUNCTATE KERATITIS (SPK)
OS_SPK: 1+
OD_SPK: 1+

## 2021-08-13 ASSESSMENT — VISUAL ACUITY
OS_BCVA: CF@3FT
OD_BCVA: 20/50-1

## 2021-09-02 ENCOUNTER — OFFICE (OUTPATIENT)
Dept: URBAN - METROPOLITAN AREA CLINIC 32 | Facility: CLINIC | Age: 61
Setting detail: OPHTHALMOLOGY
End: 2021-09-02
Payer: COMMERCIAL

## 2021-09-02 DIAGNOSIS — E11.3591: ICD-10-CM

## 2021-09-02 DIAGNOSIS — E11.3552: ICD-10-CM

## 2021-09-02 PROCEDURE — 67028 INJECTION EYE DRUG: CPT | Performed by: OPHTHALMOLOGY

## 2021-09-02 PROCEDURE — 92134 CPTRZ OPH DX IMG PST SGM RTA: CPT | Performed by: OPHTHALMOLOGY

## 2021-09-02 ASSESSMENT — REFRACTION_CURRENTRX
OS_OVR_VA: 20/
OD_OVR_VA: 20/
OS_ADD: +1.25
OD_CYLINDER: -0.25
OD_ADD: +1.25
OD_AXIS: 157
OD_SPHERE: -0.50
OS_CYLINDER: -0.25
OS_AXIS: 107
OS_SPHERE: -0.75

## 2021-09-02 ASSESSMENT — LID POSITION - DERMATOCHALASIS
OD_DERMATOCHALASIS: 1+
OS_DERMATOCHALASIS: 1+

## 2021-09-02 ASSESSMENT — REFRACTION_AUTOREFRACTION
OD_AXIS: 104
OS_CYLINDER: -1.00
OS_AXIS: 95
OS_SPHERE: +1.00
OD_SPHERE: +0.50
OD_CYLINDER: -0.50

## 2021-09-02 ASSESSMENT — CONFRONTATIONAL VISUAL FIELD TEST (CVF)
OS_FINDINGS: FULL
OD_FINDINGS: FULL

## 2021-09-02 ASSESSMENT — SPHEQUIV_DERIVED
OS_SPHEQUIV: -0.875
OD_SPHEQUIV: -0.625
OS_SPHEQUIV: 0.5
OD_SPHEQUIV: 0.25

## 2021-09-02 ASSESSMENT — SUPERFICIAL PUNCTATE KERATITIS (SPK)
OD_SPK: 1+
OS_SPK: 1+

## 2021-09-02 ASSESSMENT — VISUAL ACUITY
OS_BCVA: 20/40
OD_BCVA: 20/40

## 2021-09-02 ASSESSMENT — REFRACTION_MANIFEST
OS_ADD: +1.25
OD_AXIS: 157
OD_CYLINDER: -0.25
OS_CYLINDER: -0.25
OD_SPHERE: -0.50
OD_ADD: +1.25
OS_SPHERE: -0.75
OS_AXIS: 107

## 2021-10-19 ENCOUNTER — OFFICE (OUTPATIENT)
Dept: URBAN - METROPOLITAN AREA CLINIC 32 | Facility: CLINIC | Age: 61
Setting detail: OPHTHALMOLOGY
End: 2021-10-19
Payer: COMMERCIAL

## 2021-10-19 DIAGNOSIS — E11.3591: ICD-10-CM

## 2021-10-19 DIAGNOSIS — E11.3552: ICD-10-CM

## 2021-10-19 DIAGNOSIS — H43.11: ICD-10-CM

## 2021-10-19 PROCEDURE — 92235 FLUORESCEIN ANGRPH MLTIFRAME: CPT | Performed by: OPHTHALMOLOGY

## 2021-10-19 PROCEDURE — 92201 OPSCPY EXTND RTA DRAW UNI/BI: CPT | Performed by: OPHTHALMOLOGY

## 2021-10-19 PROCEDURE — 92134 CPTRZ OPH DX IMG PST SGM RTA: CPT | Performed by: OPHTHALMOLOGY

## 2021-10-19 PROCEDURE — 99214 OFFICE O/P EST MOD 30 MIN: CPT | Performed by: OPHTHALMOLOGY

## 2021-10-19 ASSESSMENT — REFRACTION_MANIFEST
OS_CYLINDER: -0.25
OS_SPHERE: -0.75
OD_CYLINDER: -0.25
OD_ADD: +1.25
OS_AXIS: 107
OD_AXIS: 157
OS_ADD: +1.25
OD_SPHERE: -0.50

## 2021-10-19 ASSESSMENT — REFRACTION_CURRENTRX
OD_SPHERE: -0.50
OD_ADD: +1.25
OS_AXIS: 107
OD_AXIS: 157
OS_OVR_VA: 20/
OD_CYLINDER: -0.25
OS_ADD: +1.25
OS_SPHERE: -0.75
OS_CYLINDER: -0.25
OD_OVR_VA: 20/

## 2021-10-19 ASSESSMENT — LID POSITION - DERMATOCHALASIS
OS_DERMATOCHALASIS: 1+
OD_DERMATOCHALASIS: 1+

## 2021-10-19 ASSESSMENT — REFRACTION_AUTOREFRACTION
OS_SPHERE: +1.00
OD_AXIS: 104
OS_CYLINDER: -1.00
OS_AXIS: 95
OD_CYLINDER: -0.50
OD_SPHERE: +0.50

## 2021-10-19 ASSESSMENT — VISUAL ACUITY
OS_BCVA: 20/30+2
OD_BCVA: 20/30-2

## 2021-10-19 ASSESSMENT — SPHEQUIV_DERIVED
OD_SPHEQUIV: -0.625
OS_SPHEQUIV: -0.875
OS_SPHEQUIV: 0.5
OD_SPHEQUIV: 0.25

## 2021-10-19 ASSESSMENT — SUPERFICIAL PUNCTATE KERATITIS (SPK)
OS_SPK: 1+
OD_SPK: 1+

## 2021-11-12 ASSESSMENT — TONOMETRY
OS_IOP_MMHG: 16
OD_IOP_MMHG: 14

## 2021-11-12 ASSESSMENT — VISUAL ACUITY
OS_SC: 20/80 SN
OD_CC: 20/25 SN
OS_CC: 20/50 SN
OS_CC: 20/25 SN
OD_SC: 20/80 SN
OD_CC: 20/30 SN

## 2021-12-20 ENCOUNTER — OFFICE (OUTPATIENT)
Dept: URBAN - METROPOLITAN AREA CLINIC 32 | Facility: CLINIC | Age: 61
Setting detail: OPHTHALMOLOGY
End: 2021-12-20
Payer: COMMERCIAL

## 2021-12-20 DIAGNOSIS — E11.3552: ICD-10-CM

## 2021-12-20 DIAGNOSIS — H43.11: ICD-10-CM

## 2021-12-20 DIAGNOSIS — E11.3591: ICD-10-CM

## 2021-12-20 PROCEDURE — 92235 FLUORESCEIN ANGRPH MLTIFRAME: CPT | Performed by: OPHTHALMOLOGY

## 2021-12-20 PROCEDURE — 92201 OPSCPY EXTND RTA DRAW UNI/BI: CPT | Performed by: OPHTHALMOLOGY

## 2021-12-20 PROCEDURE — 99214 OFFICE O/P EST MOD 30 MIN: CPT | Performed by: OPHTHALMOLOGY

## 2021-12-20 PROCEDURE — 92134 CPTRZ OPH DX IMG PST SGM RTA: CPT | Performed by: OPHTHALMOLOGY

## 2021-12-20 ASSESSMENT — SUPERFICIAL PUNCTATE KERATITIS (SPK)
OD_SPK: 1+
OS_SPK: 1+

## 2021-12-20 ASSESSMENT — SPHEQUIV_DERIVED
OD_SPHEQUIV: -0.625
OS_SPHEQUIV: -0.875
OS_SPHEQUIV: 0.5
OD_SPHEQUIV: 0.25

## 2021-12-20 ASSESSMENT — LID POSITION - DERMATOCHALASIS
OS_DERMATOCHALASIS: 1+
OD_DERMATOCHALASIS: 1+

## 2021-12-20 ASSESSMENT — REFRACTION_CURRENTRX
OS_AXIS: 107
OD_OVR_VA: 20/
OS_CYLINDER: -0.25
OD_AXIS: 157
OS_OVR_VA: 20/
OD_CYLINDER: -0.25
OD_ADD: +1.25
OD_SPHERE: -0.50
OS_SPHERE: -0.75
OS_ADD: +1.25

## 2021-12-20 ASSESSMENT — VISUAL ACUITY
OS_BCVA: 20/30
OD_BCVA: 20/50

## 2021-12-20 ASSESSMENT — REFRACTION_AUTOREFRACTION
OS_CYLINDER: -1.00
OD_AXIS: 104
OD_SPHERE: +0.50
OS_SPHERE: +1.00
OD_CYLINDER: -0.50
OS_AXIS: 95

## 2021-12-20 ASSESSMENT — REFRACTION_MANIFEST
OD_ADD: +1.25
OS_CYLINDER: -0.25
OD_AXIS: 157
OS_SPHERE: -0.75
OS_ADD: +1.25
OS_AXIS: 107
OD_SPHERE: -0.50
OD_CYLINDER: -0.25

## 2022-02-15 ENCOUNTER — OFFICE (OUTPATIENT)
Dept: URBAN - METROPOLITAN AREA CLINIC 32 | Facility: CLINIC | Age: 62
Setting detail: OPHTHALMOLOGY
End: 2022-02-15
Payer: COMMERCIAL

## 2022-02-15 DIAGNOSIS — E11.3591: ICD-10-CM

## 2022-02-15 DIAGNOSIS — E11.3592: ICD-10-CM

## 2022-02-15 DIAGNOSIS — H35.373: ICD-10-CM

## 2022-02-15 PROBLEM — H43.11 VITREOUS HEMORRHAGE; RIGHT EYE: Status: RESOLVED | Noted: 2021-04-05 | Resolved: 2022-02-15

## 2022-02-15 PROCEDURE — 92134 CPTRZ OPH DX IMG PST SGM RTA: CPT | Performed by: OPHTHALMOLOGY

## 2022-02-15 PROCEDURE — 92235 FLUORESCEIN ANGRPH MLTIFRAME: CPT | Performed by: OPHTHALMOLOGY

## 2022-02-15 PROCEDURE — 92201 OPSCPY EXTND RTA DRAW UNI/BI: CPT | Performed by: OPHTHALMOLOGY

## 2022-02-15 PROCEDURE — 99214 OFFICE O/P EST MOD 30 MIN: CPT | Performed by: OPHTHALMOLOGY

## 2022-02-15 ASSESSMENT — REFRACTION_CURRENTRX
OD_ADD: +1.25
OD_OVR_VA: 20/
OD_AXIS: 157
OS_CYLINDER: -0.25
OD_CYLINDER: -0.25
OS_AXIS: 107
OS_SPHERE: -0.75
OS_ADD: +1.25
OS_OVR_VA: 20/
OD_SPHERE: -0.50

## 2022-02-15 ASSESSMENT — VISUAL ACUITY
OD_BCVA: 20/80-1
OS_BCVA: 20/40-2

## 2022-02-15 ASSESSMENT — REFRACTION_AUTOREFRACTION
OD_AXIS: 104
OD_SPHERE: +0.50
OS_AXIS: 95
OD_CYLINDER: -0.50
OS_CYLINDER: -1.00
OS_SPHERE: +1.00

## 2022-02-15 ASSESSMENT — REFRACTION_MANIFEST
OD_CYLINDER: -0.25
OD_ADD: +1.25
OD_SPHERE: -0.50
OS_CYLINDER: -0.25
OS_AXIS: 107
OD_AXIS: 157
OS_SPHERE: -0.75
OS_ADD: +1.25

## 2022-02-15 ASSESSMENT — LID POSITION - DERMATOCHALASIS
OS_DERMATOCHALASIS: 1+
OD_DERMATOCHALASIS: 1+

## 2022-02-15 ASSESSMENT — SUPERFICIAL PUNCTATE KERATITIS (SPK)
OS_SPK: 1+
OD_SPK: 1+

## 2022-02-15 ASSESSMENT — SPHEQUIV_DERIVED
OS_SPHEQUIV: 0.5
OS_SPHEQUIV: -0.875
OD_SPHEQUIV: -0.625
OD_SPHEQUIV: 0.25

## 2022-06-07 ENCOUNTER — OFFICE (OUTPATIENT)
Dept: URBAN - METROPOLITAN AREA CLINIC 32 | Facility: CLINIC | Age: 62
Setting detail: OPHTHALMOLOGY
End: 2022-06-07
Payer: COMMERCIAL

## 2022-06-07 DIAGNOSIS — E11.3591: ICD-10-CM

## 2022-06-07 DIAGNOSIS — H35.373: ICD-10-CM

## 2022-06-07 DIAGNOSIS — E11.3592: ICD-10-CM

## 2022-06-07 PROBLEM — H02.834 DERMATOCHALASIS; RIGHT UPPER LID, RIGHT LOWER LID, LEFT UPPER LID, LEFT LOWER LID: Status: ACTIVE | Noted: 2019-07-26

## 2022-06-07 PROBLEM — H02.835 DERMATOCHALASIS; RIGHT UPPER LID, RIGHT LOWER LID, LEFT UPPER LID, LEFT LOWER LID: Status: ACTIVE | Noted: 2019-07-26

## 2022-06-07 PROBLEM — H25.13 CATARACT SENILE NUCLEAR SCLEROSIS;  ,, BOTH EYES: Status: ACTIVE | Noted: 2021-10-19

## 2022-06-07 PROBLEM — H02.831 DERMATOCHALASIS; RIGHT UPPER LID, RIGHT LOWER LID, LEFT UPPER LID, LEFT LOWER LID: Status: ACTIVE | Noted: 2019-07-26

## 2022-06-07 PROBLEM — H02.832 DERMATOCHALASIS; RIGHT UPPER LID, RIGHT LOWER LID, LEFT UPPER LID, LEFT LOWER LID: Status: ACTIVE | Noted: 2019-07-26

## 2022-06-07 PROBLEM — H10.45 ALLERGIC CONJUNCTIVITIS: Status: ACTIVE | Noted: 2019-07-26

## 2022-06-07 PROCEDURE — 92134 CPTRZ OPH DX IMG PST SGM RTA: CPT | Performed by: OPHTHALMOLOGY

## 2022-06-07 PROCEDURE — 92201 OPSCPY EXTND RTA DRAW UNI/BI: CPT | Performed by: OPHTHALMOLOGY

## 2022-06-07 PROCEDURE — 92235 FLUORESCEIN ANGRPH MLTIFRAME: CPT | Performed by: OPHTHALMOLOGY

## 2022-06-07 PROCEDURE — 99214 OFFICE O/P EST MOD 30 MIN: CPT | Performed by: OPHTHALMOLOGY

## 2022-06-07 ASSESSMENT — PACHYMETRY
OD_CT_UM: 553
OD_CT_CORRECTION: -1
OS_CT_CORRECTION: -1
OS_CT_UM: 555

## 2022-06-07 ASSESSMENT — SUPERFICIAL PUNCTATE KERATITIS (SPK)
OD_SPK: 1+
OS_SPK: 1+

## 2022-06-07 ASSESSMENT — LID POSITION - DERMATOCHALASIS
OS_DERMATOCHALASIS: 1+
OD_DERMATOCHALASIS: 1+

## 2022-06-07 ASSESSMENT — CONFRONTATIONAL VISUAL FIELD TEST (CVF)
OS_FINDINGS: FULL
OD_FINDINGS: FULL

## 2022-06-07 ASSESSMENT — TONOMETRY
OD_IOP_MMHG: 10
OS_IOP_MMHG: 10

## 2022-06-08 ASSESSMENT — REFRACTION_CURRENTRX
OD_CYLINDER: -0.25
OD_ADD: +1.25
OS_AXIS: 107
OD_AXIS: 157
OS_OVR_VA: 20/
OD_OVR_VA: 20/
OS_SPHERE: -0.75
OD_SPHERE: -0.50
OS_ADD: +1.25
OS_CYLINDER: -0.25

## 2022-06-08 ASSESSMENT — REFRACTION_AUTOREFRACTION
OS_AXIS: 95
OS_CYLINDER: -1.00
OD_SPHERE: +0.50
OD_AXIS: 104
OD_CYLINDER: -0.50
OS_SPHERE: +1.00

## 2022-06-08 ASSESSMENT — SPHEQUIV_DERIVED
OS_SPHEQUIV: -0.875
OD_SPHEQUIV: 0.25
OD_SPHEQUIV: -0.625
OS_SPHEQUIV: 0.5

## 2022-06-08 ASSESSMENT — REFRACTION_MANIFEST
OD_CYLINDER: -0.25
OS_SPHERE: -0.75
OS_ADD: +1.25
OD_ADD: +1.25
OS_AXIS: 107
OS_CYLINDER: -0.25
OD_SPHERE: -0.50
OD_AXIS: 157

## 2022-06-08 ASSESSMENT — VISUAL ACUITY
OS_BCVA: 20/25
OD_BCVA: 20/25-2

## 2022-09-16 ENCOUNTER — OFFICE (OUTPATIENT)
Dept: URBAN - METROPOLITAN AREA CLINIC 32 | Facility: CLINIC | Age: 62
Setting detail: OPHTHALMOLOGY
End: 2022-09-16
Payer: COMMERCIAL

## 2022-09-16 DIAGNOSIS — E11.3591: ICD-10-CM

## 2022-09-16 DIAGNOSIS — E11.3592: ICD-10-CM

## 2022-09-16 DIAGNOSIS — H35.373: ICD-10-CM

## 2022-09-16 PROCEDURE — 92235 FLUORESCEIN ANGRPH MLTIFRAME: CPT | Performed by: OPHTHALMOLOGY

## 2022-09-16 PROCEDURE — 92134 CPTRZ OPH DX IMG PST SGM RTA: CPT | Performed by: OPHTHALMOLOGY

## 2022-09-16 PROCEDURE — 67028 INJECTION EYE DRUG: CPT | Performed by: OPHTHALMOLOGY

## 2022-09-16 ASSESSMENT — REFRACTION_CURRENTRX
OD_CYLINDER: -0.25
OS_SPHERE: -0.75
OS_OVR_VA: 20/
OD_AXIS: 157
OD_OVR_VA: 20/
OS_CYLINDER: -0.25
OD_ADD: +1.25
OS_AXIS: 107
OD_SPHERE: -0.50
OS_ADD: +1.25

## 2022-09-16 ASSESSMENT — SPHEQUIV_DERIVED
OS_SPHEQUIV: 0.5
OS_SPHEQUIV: -0.875
OD_SPHEQUIV: 0.25
OD_SPHEQUIV: -0.625

## 2022-09-16 ASSESSMENT — VISUAL ACUITY
OS_BCVA: 20/60-2
OD_BCVA: 20/60-2

## 2022-09-16 ASSESSMENT — CONFRONTATIONAL VISUAL FIELD TEST (CVF)
OD_FINDINGS: FULL
OS_FINDINGS: FULL

## 2022-09-16 ASSESSMENT — REFRACTION_MANIFEST
OS_AXIS: 107
OD_ADD: +1.25
OD_CYLINDER: -0.25
OS_SPHERE: -0.75
OS_ADD: +1.25
OD_AXIS: 157
OS_CYLINDER: -0.25
OD_SPHERE: -0.50

## 2022-09-16 ASSESSMENT — REFRACTION_AUTOREFRACTION
OD_CYLINDER: -0.50
OS_SPHERE: +1.00
OD_AXIS: 104
OS_CYLINDER: -1.00
OD_SPHERE: +0.50
OS_AXIS: 95

## 2022-09-16 ASSESSMENT — SUPERFICIAL PUNCTATE KERATITIS (SPK)
OD_SPK: 1+
OS_SPK: 1+

## 2022-09-16 ASSESSMENT — LID POSITION - DERMATOCHALASIS
OD_DERMATOCHALASIS: 1+
OS_DERMATOCHALASIS: 1+

## 2022-10-17 ENCOUNTER — OFFICE (OUTPATIENT)
Dept: URBAN - METROPOLITAN AREA CLINIC 32 | Facility: CLINIC | Age: 62
Setting detail: OPHTHALMOLOGY
End: 2022-10-17
Payer: COMMERCIAL

## 2022-10-17 DIAGNOSIS — E11.3592: ICD-10-CM

## 2022-10-17 DIAGNOSIS — E11.3591: ICD-10-CM

## 2022-10-17 DIAGNOSIS — H35.373: ICD-10-CM

## 2022-10-17 PROCEDURE — 99213 OFFICE O/P EST LOW 20 MIN: CPT | Performed by: OPHTHALMOLOGY

## 2022-10-17 PROCEDURE — 92134 CPTRZ OPH DX IMG PST SGM RTA: CPT | Performed by: OPHTHALMOLOGY

## 2022-10-17 PROCEDURE — 67028 INJECTION EYE DRUG: CPT | Performed by: OPHTHALMOLOGY

## 2022-10-17 ASSESSMENT — REFRACTION_AUTOREFRACTION
OS_CYLINDER: -1.00
OS_SPHERE: +1.00
OD_CYLINDER: -0.50
OD_SPHERE: +0.50
OS_AXIS: 95
OD_AXIS: 104

## 2022-10-17 ASSESSMENT — VISUAL ACUITY
OS_BCVA: 20/40-2
OD_BCVA: 20/25

## 2022-10-17 ASSESSMENT — SUPERFICIAL PUNCTATE KERATITIS (SPK)
OS_SPK: 1+
OD_SPK: 1+

## 2022-10-17 ASSESSMENT — SPHEQUIV_DERIVED
OD_SPHEQUIV: 0.25
OS_SPHEQUIV: 0.5

## 2022-10-17 ASSESSMENT — CONFRONTATIONAL VISUAL FIELD TEST (CVF)
OD_FINDINGS: FULL
OS_FINDINGS: FULL

## 2022-10-17 ASSESSMENT — LID POSITION - DERMATOCHALASIS
OD_DERMATOCHALASIS: 1+
OS_DERMATOCHALASIS: 1+

## 2023-12-19 NOTE — PATIENT DISCUSSION
The patient was told that their pupil does not dilate well and this will make for an operation that might involve extra steps and, perhaps, extra risks. This could also mean that their pupil might be larger after procedure. 97.8

## 2024-10-22 NOTE — PATIENT DISCUSSION
Continue Xiidra 1 drop in both eyes 2 times a day. Post-Care Instructions: I reviewed with the patient in detail post-care instructions. Patient is to wear sunprotection, and avoid picking at any of the treated lesions. Pt may apply Vaseline to crusted or scabbing areas. Consent: The patient's consent was obtained including but not limited to risks of crusting, scabbing, blistering, scarring, darker or lighter pigmentary change, recurrence, incomplete removal and infection. Detail Level: Detailed Render Note In Bullet Format When Appropriate: No Show Aperture Variable?: Yes Duration Of Freeze Thaw-Cycle (Seconds): 3